# Patient Record
Sex: MALE | Race: WHITE | Employment: FULL TIME | URBAN - NONMETROPOLITAN AREA
[De-identification: names, ages, dates, MRNs, and addresses within clinical notes are randomized per-mention and may not be internally consistent; named-entity substitution may affect disease eponyms.]

---

## 2017-05-22 ENCOUNTER — OFFICE VISIT (OUTPATIENT)
Dept: FAMILY MEDICINE CLINIC | Facility: CLINIC | Age: 47
End: 2017-05-22

## 2017-05-22 VITALS
HEART RATE: 108 BPM | DIASTOLIC BLOOD PRESSURE: 80 MMHG | OXYGEN SATURATION: 98 % | TEMPERATURE: 98 F | WEIGHT: 175 LBS | SYSTOLIC BLOOD PRESSURE: 120 MMHG | RESPIRATION RATE: 12 BRPM

## 2017-05-22 DIAGNOSIS — K52.9 GASTROENTERITIS: Primary | ICD-10-CM

## 2017-05-22 PROCEDURE — 99213 OFFICE O/P EST LOW 20 MIN: CPT | Performed by: FAMILY MEDICINE

## 2017-05-22 NOTE — PATIENT INSTRUCTIONS
PUSH FLUIDS, CLEAR LIQUIDS AND ADVANCE TO BLAND / BRAT DIET AS TOLERATED, GOOD HANDWASHING, S/S OF DEHYDRATION DISCUSSED, F/U IF ? Patient was given note to be off work today and tomorrow. He may return to work on Wednesday if feeling better.   Patient ma

## 2017-05-22 NOTE — PROGRESS NOTES
HPI:    Patient ID: Lashaun Doyle is a 42year old male. Patient presents with:  Vomiting: DIARRHEA, BODY ACHES, FEVER--STARTED LAST NIGHT--    HPI acute onset of diarrhea , nausea and vomiting, fever, chills, wife and son had same symptoms over past 1-2 we temperature 98.1 °F (36.7 °C), temperature source Temporal, weight 175 lb, SpO2 98 %.        ASSESSMENT/PLAN:   Gastroenteritis  (primary encounter diagnosis)  Patient Instructions   PUSH FLUIDS, CLEAR LIQUIDS AND ADVANCE TO BLAND / BRAT DIET AS TOLERATED,

## 2018-01-04 ENCOUNTER — MED REC SCAN ONLY (OUTPATIENT)
Dept: FAMILY MEDICINE CLINIC | Facility: CLINIC | Age: 48
End: 2018-01-04

## 2018-01-27 ENCOUNTER — OFFICE VISIT (OUTPATIENT)
Dept: FAMILY MEDICINE CLINIC | Facility: CLINIC | Age: 48
End: 2018-01-27

## 2018-01-27 VITALS
DIASTOLIC BLOOD PRESSURE: 68 MMHG | HEART RATE: 72 BPM | HEIGHT: 68.5 IN | BODY MASS INDEX: 26.52 KG/M2 | OXYGEN SATURATION: 97 % | SYSTOLIC BLOOD PRESSURE: 120 MMHG | TEMPERATURE: 98 F | WEIGHT: 177 LBS

## 2018-01-27 DIAGNOSIS — K21.9 GASTROESOPHAGEAL REFLUX DISEASE WITHOUT ESOPHAGITIS: ICD-10-CM

## 2018-01-27 DIAGNOSIS — I34.1 MVP (MITRAL VALVE PROLAPSE): ICD-10-CM

## 2018-01-27 DIAGNOSIS — R07.89 ATYPICAL CHEST PAIN: Primary | ICD-10-CM

## 2018-01-27 PROBLEM — I34.0 MILD MITRAL REGURGITATION: Status: ACTIVE | Noted: 2018-01-27

## 2018-01-27 PROCEDURE — 99213 OFFICE O/P EST LOW 20 MIN: CPT | Performed by: FAMILY MEDICINE

## 2018-01-27 RX ORDER — OMEPRAZOLE 20 MG/1
20 CAPSULE, DELAYED RELEASE ORAL
Qty: 30 CAPSULE | Refills: 2 | Status: SHIPPED | OUTPATIENT
Start: 2018-01-27 | End: 2019-07-03 | Stop reason: ALTCHOICE

## 2018-01-27 NOTE — PROGRESS NOTES
Kirt Asencio is a 41year old male. Patient presents with:  ER F/U: Chest pain  Patient is here with his wife a cardiac cath tech  HPI:   Patient seen at [facility] emergency room 1/2/18 for chest pain. An echocardiogram was done.   Res related, edema  GI: denies abdominal pain , patient admits to heartburn several times per day, review of contributing factors reveals patient does tend to eat late.   He is out of town Monday through Thursday lives in a hotel  NEURO: denies headaches  PSYCH endoscopy. The patient indicates understanding of these issues and agrees to the plan. Vivian Young.  Jerri Morris, FAAFP

## 2019-07-03 ENCOUNTER — OFFICE VISIT (OUTPATIENT)
Dept: FAMILY MEDICINE CLINIC | Facility: CLINIC | Age: 49
End: 2019-07-03
Payer: COMMERCIAL

## 2019-07-03 VITALS
OXYGEN SATURATION: 98 % | WEIGHT: 179 LBS | TEMPERATURE: 99 F | DIASTOLIC BLOOD PRESSURE: 80 MMHG | BODY MASS INDEX: 27 KG/M2 | SYSTOLIC BLOOD PRESSURE: 110 MMHG | HEART RATE: 60 BPM

## 2019-07-03 DIAGNOSIS — G89.29 CHRONIC MIDLINE LOW BACK PAIN WITHOUT SCIATICA: Primary | ICD-10-CM

## 2019-07-03 DIAGNOSIS — M54.50 CHRONIC MIDLINE LOW BACK PAIN WITHOUT SCIATICA: Primary | ICD-10-CM

## 2019-07-03 DIAGNOSIS — M99.03 LUMBAR REGION SOMATIC DYSFUNCTION: ICD-10-CM

## 2019-07-03 DIAGNOSIS — M99.08 RIB CAGE REGION SOMATIC DYSFUNCTION: ICD-10-CM

## 2019-07-03 DIAGNOSIS — M99.01 CERVICAL (NECK) REGION SOMATIC DYSFUNCTION: ICD-10-CM

## 2019-07-03 PROCEDURE — 99213 OFFICE O/P EST LOW 20 MIN: CPT | Performed by: FAMILY MEDICINE

## 2019-07-03 PROCEDURE — 98926 OSTEOPATH MANJ 3-4 REGIONS: CPT | Performed by: FAMILY MEDICINE
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
## 2019-07-03 NOTE — PATIENT INSTRUCTIONS
Recommend moist heat for 10 to 15 minutes up to every 4 hours as needed followed by stretches.   Patient was instructed in proper spinal, hip, hamstring stretches  Zone 1 self activation techniques reviewed and demonstrated  Would recommend check imaging if

## 2020-02-03 NOTE — PROGRESS NOTES
Martha Mccracken is a 54year old male. Patient presents with:   Anxiety: needs referral for therapist.    HPI:    for government, travels a lot  Patient states that he has been having significant anxiety and stress with his job but also has Resp 12   Ht 68.5\"   Wt 179 lb (81.2 kg)   BMI 26.82 kg/m²   GENERAL: well developed, well nourished,in no apparent distress, well hydrated  SKIN: no rashes,no suspicious lesions  ENT: TMs: intact, good mobility, Nose: turbinates clear, no dc, Throat: no

## 2020-02-03 NOTE — PATIENT INSTRUCTIONS
20 minutes was spent in education and counseling as well as anticipatory guidance, emotional support provided  Patient referred to Dr. Lorri Angeles at [facility] for psychology intervention  We will start citalopram 10 mg nightly, side effects

## 2020-03-21 ENCOUNTER — TELEPHONE (OUTPATIENT)
Dept: FAMILY MEDICINE CLINIC | Facility: CLINIC | Age: 50
End: 2020-03-21

## 2020-03-21 NOTE — TELEPHONE ENCOUNTER
Patient's wife called to request refill for Citalopram Hydrobromide. She will call us back to let us know what pharmacy we should send his prescription to. He is currently in [place] for work.

## 2020-03-23 NOTE — TELEPHONE ENCOUNTER
Called patient's wife cell to get pharmacy information. No answer, could not leave voice mail, mailbox was full.

## 2020-06-20 ENCOUNTER — OFFICE VISIT (OUTPATIENT)
Dept: FAMILY MEDICINE CLINIC | Facility: CLINIC | Age: 50
End: 2020-06-20
Payer: COMMERCIAL

## 2020-06-20 VITALS
HEART RATE: 64 BPM | OXYGEN SATURATION: 96 % | WEIGHT: 178.38 LBS | BODY MASS INDEX: 27 KG/M2 | DIASTOLIC BLOOD PRESSURE: 80 MMHG | TEMPERATURE: 98 F | SYSTOLIC BLOOD PRESSURE: 124 MMHG

## 2020-06-20 DIAGNOSIS — D04.71 CARCINOMA IN SITU OF SKIN OF RIGHT LOWER EXTREMITY INCLUDING HIP: Primary | ICD-10-CM

## 2020-06-20 DIAGNOSIS — L98.9 SKIN LESION OF RIGHT LEG: ICD-10-CM

## 2020-06-20 DIAGNOSIS — L25.9 CONTACT DERMATITIS AND ECZEMA: ICD-10-CM

## 2020-06-20 PROCEDURE — 88305 TISSUE EXAM BY PATHOLOGIST: CPT | Performed by: FAMILY MEDICINE

## 2020-06-20 PROCEDURE — 99213 OFFICE O/P EST LOW 20 MIN: CPT | Performed by: FAMILY MEDICINE

## 2020-06-20 PROCEDURE — 11601 EXC TR-EXT MAL+MARG 0.6-1 CM: CPT | Performed by: FAMILY MEDICINE

## 2020-06-20 NOTE — PROGRESS NOTES
HPI:    Patient ID: Allan Livingston is a 54year old male.   Pt lesion R lower leg - increase size / bleeding  Concern w/  W/o hx skin cancer  Rash post R ankle - itchy  HPI    Review of Systems         Current Outpatient Medications   Medication Sig Dispen

## 2020-06-25 ENCOUNTER — TELEPHONE (OUTPATIENT)
Dept: FAMILY MEDICINE CLINIC | Facility: CLINIC | Age: 50
End: 2020-06-25

## 2020-06-25 DIAGNOSIS — C44.92 CANCER OF SKIN, SQUAMOUS CELL: Primary | ICD-10-CM

## 2020-06-25 NOTE — TELEPHONE ENCOUNTER
----- Message from Yeni Farrell DO sent at 6/23/2020 12:46 PM CDT -----  Squamous cell skin cancer involving margins. Recommend Dr. Lee brandon  For complete excision.

## 2020-06-25 NOTE — TELEPHONE ENCOUNTER
Final Diagnosis:   Skin, right posterior lower leg, biopsy:  -Squamous cell carcinoma in situ.  -The tumor involves the peripheral margins.      Electronically signed by Wilfredo Vaughn MD on 6/23/2020 at 12:33 PM     LEFT MESSAGE TO CALL OFFICE

## 2020-06-25 NOTE — TELEPHONE ENCOUNTER
Patient and spouse instructed; Dr Mirza Castro got on phone and answered patient's questions.   Will schedule appt with Dr Lucy Leo when gets back from [address] [place] [address]

## 2020-06-25 NOTE — TELEPHONE ENCOUNTER
Patient will call back with wife also on phone, she is a nurse. Dr Irvin Weiss said ok to wait 1 month, since presently out in [address] [place] [address]. Doctor got 95% of it, it is insitu, so encapsulated; so can see Dr Donovan Gonzalez when back.

## 2020-07-07 ENCOUNTER — OFFICE VISIT (OUTPATIENT)
Dept: SURGERY | Facility: CLINIC | Age: 50
End: 2020-07-07
Payer: COMMERCIAL

## 2020-07-07 ENCOUNTER — OFFICE VISIT (OUTPATIENT)
Dept: FAMILY MEDICINE CLINIC | Facility: CLINIC | Age: 50
End: 2020-07-07
Payer: COMMERCIAL

## 2020-07-07 VITALS
TEMPERATURE: 98 F | OXYGEN SATURATION: 98 % | DIASTOLIC BLOOD PRESSURE: 76 MMHG | HEART RATE: 67 BPM | BODY MASS INDEX: 26 KG/M2 | WEIGHT: 175 LBS | SYSTOLIC BLOOD PRESSURE: 120 MMHG

## 2020-07-07 VITALS — BODY MASS INDEX: 26.67 KG/M2 | WEIGHT: 178 LBS | HEIGHT: 68.5 IN | TEMPERATURE: 98 F

## 2020-07-07 DIAGNOSIS — D09.9 SQUAMOUS CELL CARCINOMA IN SITU: Primary | ICD-10-CM

## 2020-07-07 DIAGNOSIS — L25.9 CONTACT DERMATITIS AND ECZEMA: Primary | ICD-10-CM

## 2020-07-07 PROCEDURE — 99213 OFFICE O/P EST LOW 20 MIN: CPT | Performed by: FAMILY MEDICINE

## 2020-07-07 PROCEDURE — 99203 OFFICE O/P NEW LOW 30 MIN: CPT | Performed by: SURGERY

## 2020-07-07 PROCEDURE — 3008F BODY MASS INDEX DOCD: CPT | Performed by: SURGERY

## 2020-07-07 PROCEDURE — 3078F DIAST BP <80 MM HG: CPT | Performed by: FAMILY MEDICINE

## 2020-07-07 PROCEDURE — 3074F SYST BP LT 130 MM HG: CPT | Performed by: FAMILY MEDICINE

## 2020-07-07 RX ORDER — CLOBETASOL PROPIONATE 0.5 MG/G
1 CREAM TOPICAL 2 TIMES DAILY
Qty: 15 G | Refills: 0 | Status: SHIPPED | OUTPATIENT
Start: 2020-07-07

## 2020-07-07 NOTE — PROGRESS NOTES
HPI:    Patient ID: Charissa Mota is a 54year old male. Patient presents with:  Rash: x 1 week. both feet.  some leg swelling     Pt living in [place] campground in [place], [facility] in the area x 20 days  C/o itchy rash on tops of both feet x 1 week  Tr Discussed likely causes of edema being related to the heat, effects of prolonged standing and tightly tight boots. No medication indicated. Recommend low-sodium food choices and elevate legs at rest.  Avoid overly tightening boot lacsaige Gonzalez.  Michelle Guthrie

## 2020-07-07 NOTE — PATIENT INSTRUCTIONS
Recommend cool compresses and ice massage to manage itching, cut toenails short to avoid excoriations  Clobetasol 0.05% cream to be applied to affected area thinly twice daily as needed till clear  Discussed likely causes of edema being related to the heat

## 2020-07-10 ENCOUNTER — TELEPHONE (OUTPATIENT)
Dept: SURGERY | Facility: CLINIC | Age: 50
End: 2020-07-10

## 2020-07-10 DIAGNOSIS — D04.71 SQUAMOUS CELL CARCINOMA IN SITU (SCCIS) OF SKIN OF RIGHT CALF: Primary | ICD-10-CM

## 2020-07-17 ENCOUNTER — TELEPHONE (OUTPATIENT)
Dept: SURGERY | Facility: CLINIC | Age: 50
End: 2020-07-17

## 2020-07-21 ENCOUNTER — LAB ENCOUNTER (OUTPATIENT)
Dept: LAB | Facility: HOSPITAL | Age: 50
End: 2020-07-21
Attending: SURGERY
Payer: COMMERCIAL

## 2020-07-21 DIAGNOSIS — D04.9 SQUAMOUS CELL CARCINOMA IN SITU (SCCIS) OF SKIN: ICD-10-CM

## 2020-07-22 LAB — SARS-COV-2 RNA RESP QL NAA+PROBE: NOT DETECTED

## 2020-07-23 ENCOUNTER — ANESTHESIA EVENT (OUTPATIENT)
Dept: SURGERY | Facility: HOSPITAL | Age: 50
End: 2020-07-23
Payer: COMMERCIAL

## 2020-07-24 ENCOUNTER — ANESTHESIA (OUTPATIENT)
Dept: SURGERY | Facility: HOSPITAL | Age: 50
End: 2020-07-24
Payer: COMMERCIAL

## 2020-07-24 ENCOUNTER — HOSPITAL ENCOUNTER (OUTPATIENT)
Facility: HOSPITAL | Age: 50
Setting detail: HOSPITAL OUTPATIENT SURGERY
Discharge: HOME | End: 2020-07-24
Attending: SURGERY | Admitting: SURGERY
Payer: COMMERCIAL

## 2020-07-24 VITALS
DIASTOLIC BLOOD PRESSURE: 87 MMHG | OXYGEN SATURATION: 99 % | SYSTOLIC BLOOD PRESSURE: 121 MMHG | WEIGHT: 171.88 LBS | HEIGHT: 68 IN | BODY MASS INDEX: 26.05 KG/M2 | RESPIRATION RATE: 20 BRPM | HEART RATE: 42 BPM | TEMPERATURE: 98 F

## 2020-07-24 DIAGNOSIS — D04.71 SQUAMOUS CELL CARCINOMA IN SITU (SCCIS) OF SKIN OF RIGHT CALF: ICD-10-CM

## 2020-07-24 DIAGNOSIS — D04.9 SQUAMOUS CELL CARCINOMA IN SITU (SCCIS) OF SKIN: Primary | ICD-10-CM

## 2020-07-24 PROCEDURE — 0YB93ZX EXCISION OF RIGHT LOWER EXTREMITY, PERCUTANEOUS APPROACH, DIAGNOSTIC: ICD-10-PCS | Performed by: SURGERY

## 2020-07-24 PROCEDURE — 88305 TISSUE EXAM BY PATHOLOGIST: CPT | Performed by: SURGERY

## 2020-07-24 RX ORDER — SODIUM CHLORIDE, SODIUM LACTATE, POTASSIUM CHLORIDE, CALCIUM CHLORIDE 600; 310; 30; 20 MG/100ML; MG/100ML; MG/100ML; MG/100ML
INJECTION, SOLUTION INTRAVENOUS CONTINUOUS
Status: DISCONTINUED | OUTPATIENT
Start: 2020-07-24 | End: 2020-07-24

## 2020-07-24 RX ORDER — HYDROMORPHONE HYDROCHLORIDE 1 MG/ML
0.4 INJECTION, SOLUTION INTRAMUSCULAR; INTRAVENOUS; SUBCUTANEOUS EVERY 5 MIN PRN
Status: CANCELLED | OUTPATIENT
Start: 2020-07-24 | End: 2020-07-24

## 2020-07-24 RX ORDER — IBUPROFEN 200 MG
600 TABLET ORAL ONCE AS NEEDED
Status: CANCELLED | OUTPATIENT
Start: 2020-07-24 | End: 2020-07-24

## 2020-07-24 RX ORDER — ACETAMINOPHEN 500 MG
1000 TABLET ORAL ONCE AS NEEDED
Status: CANCELLED | OUTPATIENT
Start: 2020-07-24 | End: 2020-07-24

## 2020-07-24 RX ORDER — MIDAZOLAM HYDROCHLORIDE 1 MG/ML
INJECTION INTRAMUSCULAR; INTRAVENOUS AS NEEDED
Status: DISCONTINUED | OUTPATIENT
Start: 2020-07-24 | End: 2020-07-24 | Stop reason: SURG

## 2020-07-24 RX ORDER — MIDAZOLAM HYDROCHLORIDE 1 MG/ML
1 INJECTION INTRAMUSCULAR; INTRAVENOUS EVERY 5 MIN PRN
Status: CANCELLED | OUTPATIENT
Start: 2020-07-24 | End: 2020-07-24

## 2020-07-24 RX ORDER — BUPIVACAINE HYDROCHLORIDE 5 MG/ML
INJECTION, SOLUTION EPIDURAL; INTRACAUDAL AS NEEDED
Status: DISCONTINUED | OUTPATIENT
Start: 2020-07-24 | End: 2020-07-24 | Stop reason: HOSPADM

## 2020-07-24 RX ORDER — NALOXONE HYDROCHLORIDE 0.4 MG/ML
80 INJECTION, SOLUTION INTRAMUSCULAR; INTRAVENOUS; SUBCUTANEOUS AS NEEDED
Status: CANCELLED | OUTPATIENT
Start: 2020-07-24 | End: 2020-07-24

## 2020-07-24 RX ORDER — SODIUM CHLORIDE, SODIUM LACTATE, POTASSIUM CHLORIDE, CALCIUM CHLORIDE 600; 310; 30; 20 MG/100ML; MG/100ML; MG/100ML; MG/100ML
INJECTION, SOLUTION INTRAVENOUS CONTINUOUS
Status: CANCELLED | OUTPATIENT
Start: 2020-07-24

## 2020-07-24 RX ORDER — CEFAZOLIN SODIUM/WATER 2 G/20 ML
2 SYRINGE (ML) INTRAVENOUS ONCE
Status: COMPLETED | OUTPATIENT
Start: 2020-07-24 | End: 2020-07-24

## 2020-07-24 RX ORDER — ACETAMINOPHEN 500 MG
1000 TABLET ORAL ONCE
Status: DISCONTINUED | OUTPATIENT
Start: 2020-07-24 | End: 2020-07-24 | Stop reason: HOSPADM

## 2020-07-24 RX ORDER — HEPARIN SODIUM 5000 [USP'U]/ML
5000 INJECTION, SOLUTION INTRAVENOUS; SUBCUTANEOUS ONCE
Status: COMPLETED | OUTPATIENT
Start: 2020-07-24 | End: 2020-07-24

## 2020-07-24 RX ORDER — HYDROCODONE BITARTRATE AND ACETAMINOPHEN 5; 325 MG/1; MG/1
1 TABLET ORAL AS NEEDED
Status: CANCELLED | OUTPATIENT
Start: 2020-07-24

## 2020-07-24 RX ORDER — HYDROCODONE BITARTRATE AND ACETAMINOPHEN 5; 325 MG/1; MG/1
2 TABLET ORAL AS NEEDED
Status: CANCELLED | OUTPATIENT
Start: 2020-07-24

## 2020-07-24 RX ORDER — ONDANSETRON 2 MG/ML
4 INJECTION INTRAMUSCULAR; INTRAVENOUS AS NEEDED
Status: CANCELLED | OUTPATIENT
Start: 2020-07-24 | End: 2020-07-24

## 2020-07-24 RX ORDER — ACETAMINOPHEN 500 MG
500 TABLET ORAL EVERY 6 HOURS PRN
COMMUNITY

## 2020-07-24 RX ORDER — LABETALOL HYDROCHLORIDE 5 MG/ML
5 INJECTION, SOLUTION INTRAVENOUS EVERY 5 MIN PRN
Status: CANCELLED | OUTPATIENT
Start: 2020-07-24 | End: 2020-07-24

## 2020-07-24 RX ADMIN — CEFAZOLIN SODIUM/WATER 2 G: 2 G/20 ML SYRINGE (ML) INTRAVENOUS at 11:08:00

## 2020-07-24 RX ADMIN — MIDAZOLAM HYDROCHLORIDE 2 MG: 1 INJECTION INTRAMUSCULAR; INTRAVENOUS at 11:05:00

## 2020-07-24 RX ADMIN — SODIUM CHLORIDE, SODIUM LACTATE, POTASSIUM CHLORIDE, CALCIUM CHLORIDE: 600; 310; 30; 20 INJECTION, SOLUTION INTRAVENOUS at 12:07:00

## 2020-07-24 NOTE — H&P
Patient presents with skin lesion right posterior calf. Patient states that the lesion has been present for at least the last few months. He states it is increasing in size occasionally ulcerates and bleeds.   He denies a history of skin cancer elsewhere no tea colored urine  MUSCULOSKELETAL: no joint complaints upper or lower extremities  HEMATOLOGY: denies hx anemia; denies bruising or excessive bleeding  Endocrine: no weight gain or loss no hot or cold intolerance     EXAM      Temperature 98.1 °F (36.7 here.   • Clinical Information 06/20/2020     Final                    Value: This result contains rich text formatting which cannot be displayed here. • Gross Description 06/20/2020     Final                    Value: This result contains rich text format

## 2020-07-24 NOTE — H&P
Saul Holloway is a 54year old male  Patient presents with:  Lesion: new patient referred by Dr. Alexus Jarvis for lesion right leg. bx lesion on right leg done 2020. REFERRED BY    Patient presents with skin lesion right posterior calf.   Patient states GI: denies nausea, vomiting, constipation, diarrhea; no rectal bleeding; no heartburn  GENITAL/: no dysuria, urgency or frequency, no tea colored urine  MUSCULOSKELETAL: no joint complaints upper or lower extremities  HEMATOLOGY: denies hx anemia; enrique 06/20/2020    Final                    Value: This result contains rich text formatting which cannot be displayed here. • Clinical Information 06/20/2020    Final                    Value: This result contains rich text formatting which cannot be displayed

## 2020-07-24 NOTE — ANESTHESIA POSTPROCEDURE EVALUATION
[address]  Patient Status:  Hospital Outpatient Surgery   Age/Gender 54year old male MRN [id number]   [facility] SURGERY Attending Lisy Aparicio, [facility] Day # 0 PCP Hosea Salcedo.  Nita Martin DO       Anesthesia Post-op N

## 2020-07-25 NOTE — OPERATIVE REPORT
[address]    PATIENT'S NAME: William Butt   ATTENDING PHYSICIAN: Elizabeth Shipman D.O.   OPERATING PHYSICIAN: Elizabeth Shipman D.O.   PATIENT ACCOUNT#:   [de-identified]    LOCATION:  [address] [place] [address] EDW 10  MEDICAL RECORD #:   [id number]       SUKHI Attention was then directed toward the excision site where the harvested full-thickness skin graft was placed over the wound.   It was sutured at the perimeter using 4-0 nylon sutures, 4 of these were left long at the 12 o'clock, 3 o'clock, 6 o'clock and 9

## 2020-07-28 NOTE — PROGRESS NOTES
Called pt and relayed results. V/U and no further questions.  Scheduled for stitch removal.     Future Appointments  7/31/2020  1:00 PM    Shakeel Samuel, [address]      [facility]

## 2020-07-29 ENCOUNTER — MED REC SCAN ONLY (OUTPATIENT)
Dept: SURGERY | Facility: CLINIC | Age: 50
End: 2020-07-29

## 2020-07-31 ENCOUNTER — TELEPHONE (OUTPATIENT)
Dept: SURGERY | Facility: CLINIC | Age: 50
End: 2020-07-31

## 2020-07-31 ENCOUNTER — OFFICE VISIT (OUTPATIENT)
Dept: SURGERY | Facility: CLINIC | Age: 50
End: 2020-07-31

## 2020-07-31 VITALS
BODY MASS INDEX: 27 KG/M2 | WEIGHT: 177 LBS | DIASTOLIC BLOOD PRESSURE: 69 MMHG | SYSTOLIC BLOOD PRESSURE: 108 MMHG | HEART RATE: 64 BPM | TEMPERATURE: 98 F

## 2020-07-31 DIAGNOSIS — D09.9 SQUAMOUS CELL CARCINOMA IN SITU: Primary | ICD-10-CM

## 2020-07-31 PROCEDURE — 99024 POSTOP FOLLOW-UP VISIT: CPT | Performed by: PHYSICIAN ASSISTANT

## 2020-07-31 PROCEDURE — 3074F SYST BP LT 130 MM HG: CPT | Performed by: PHYSICIAN ASSISTANT

## 2020-07-31 PROCEDURE — 3078F DIAST BP <80 MM HG: CPT | Performed by: PHYSICIAN ASSISTANT

## 2020-07-31 NOTE — TELEPHONE ENCOUNTER
Attempted to reach patient and spouse. Unable to leave message-voicemail full, wanted to give instructions about dressing change and to  adaptic at the [place] office along with a sheet of instructions. Contacted pt on 3rd attempt.

## 2020-07-31 NOTE — PROGRESS NOTES
Post Operative Visit Note       Active Problems  1. Squamous cell carcinoma in situ         Chief Complaint   Patient presents with:  Post-Op: 7/24 RWM EXCISION SQUAMOUS CELL CARCINOMA RIGHT CALF WITH FULL THICKNESS SKIN GRAFT, DONOR RIGHT THIGH .  Patient Smoking status: Never Smoker      Smokeless tobacco: Never Used    Substance and Sexual Activity      Alcohol use: Yes        Frequency: Monthly or less        Drinks per session: 1 or 2        Binge frequency: Never      Drug use: No    Other Topics Physical Findings   /69   Pulse 64   Temp 97.9 °F (36.6 °C)   Wt 177 lb (80.3 kg)   BMI 26.91 kg/m²   Physical Exam   Constitutional: He is oriented to person, place, and time. He appears well-developed and well-nourished. No distress.    HENT: The patient is to place an Adaptic over the wound. He should leave this in place for 3 days. He may cover the Adaptic with a gauze. After 3 days he should change the Adaptic gauze out and leave the dressing in place for an additional 3 days.   He should

## 2020-08-04 ENCOUNTER — MED REC SCAN ONLY (OUTPATIENT)
Dept: SURGERY | Facility: CLINIC | Age: 50
End: 2020-08-04

## 2020-08-06 ENCOUNTER — TELEPHONE (OUTPATIENT)
Dept: SURGERY | Facility: CLINIC | Age: 50
End: 2020-08-06

## 2020-08-06 NOTE — TELEPHONE ENCOUNTER
Patient calling states is to have the remaining of his sutures removed tomorrow. He is now in [address] and has a health care member that can remove them there. He is just clarifying on wound care instructions.      Spoke with Lindsay PETTY on wound care

## 2021-12-28 ENCOUNTER — TELEPHONE (OUTPATIENT)
Dept: FAMILY MEDICINE CLINIC | Facility: CLINIC | Age: 51
End: 2021-12-28

## 2021-12-28 NOTE — TELEPHONE ENCOUNTER
Spoke with pt. States he started with a cough a few days ago. Went to work yesterday anyway, found out someone else had Covid. Today, started with body aches, chills and fever also. Stayed home, did a home rapid test---was +.   Asks if he needs to have a PC

## 2021-12-28 NOTE — TELEPHONE ENCOUNTER
Pt did at home covid test today & it was positive. He has cough, body aches & fever. Please call to discuss.

## 2023-02-27 ENCOUNTER — PATIENT OUTREACH (OUTPATIENT)
Dept: FAMILY MEDICINE CLINIC | Facility: CLINIC | Age: 53
End: 2023-02-27

## 2024-10-28 ENCOUNTER — TELEPHONE (OUTPATIENT)
Dept: FAMILY MEDICINE CLINIC | Facility: CLINIC | Age: 54
End: 2024-10-28
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
## 2024-10-29 ENCOUNTER — APPOINTMENT (OUTPATIENT)
Dept: GENERAL RADIOLOGY | Age: 54
End: 2024-10-29
Attending: NURSE PRACTITIONER
Payer: COMMERCIAL

## 2024-10-29 ENCOUNTER — HOSPITAL ENCOUNTER (OUTPATIENT)
Age: 54
Discharge: HOME | End: 2024-10-29
Payer: COMMERCIAL

## 2024-10-29 VITALS
TEMPERATURE: 97 F | HEART RATE: 66 BPM | WEIGHT: 175 LBS | OXYGEN SATURATION: 100 % | DIASTOLIC BLOOD PRESSURE: 82 MMHG | BODY MASS INDEX: 26.52 KG/M2 | HEIGHT: 68 IN | SYSTOLIC BLOOD PRESSURE: 114 MMHG | RESPIRATION RATE: 16 BRPM

## 2024-10-29 DIAGNOSIS — S92.425A CLOSED NONDISPLACED FRACTURE OF DISTAL PHALANX OF LEFT GREAT TOE, INITIAL ENCOUNTER: Primary | ICD-10-CM

## 2024-10-29 PROCEDURE — 99204 OFFICE O/P NEW MOD 45 MIN: CPT | Performed by: NURSE PRACTITIONER

## 2024-10-29 PROCEDURE — L3260 AMBULATORY SURGICAL BOOT EAC: HCPCS | Performed by: NURSE PRACTITIONER

## 2024-10-29 PROCEDURE — 73660 X-RAY EXAM OF TOE(S): CPT | Performed by: NURSE PRACTITIONER

## 2024-10-29 RX ORDER — IBUPROFEN 600 MG/1
1 TABLET, FILM COATED ORAL 2 TIMES DAILY PRN
COMMUNITY
Start: 2024-08-27

## 2024-10-29 RX ORDER — TRAZODONE HYDROCHLORIDE 100 MG/1
1.5 TABLET ORAL NIGHTLY PRN
COMMUNITY
Start: 2024-09-06

## 2024-10-29 RX ORDER — BUPROPION HYDROCHLORIDE 300 MG/1
1 TABLET ORAL DAILY
COMMUNITY
Start: 2024-09-06

## 2024-10-29 RX ORDER — CEPHALEXIN 500 MG/1
500 CAPSULE ORAL 2 TIMES DAILY
Qty: 14 CAPSULE | Refills: 0 | Status: SHIPPED | OUTPATIENT
Start: 2024-10-29 | End: 2024-11-05
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
## 2025-02-28 ENCOUNTER — OFFICE VISIT (OUTPATIENT)
Dept: FAMILY MEDICINE CLINIC | Facility: CLINIC | Age: 55
End: 2025-02-28
Payer: COMMERCIAL

## 2025-02-28 VITALS
OXYGEN SATURATION: 98 % | RESPIRATION RATE: 16 BRPM | TEMPERATURE: 98 F | HEART RATE: 60 BPM | DIASTOLIC BLOOD PRESSURE: 68 MMHG | HEIGHT: 67.91 IN | BODY MASS INDEX: 26.22 KG/M2 | WEIGHT: 171 LBS | SYSTOLIC BLOOD PRESSURE: 118 MMHG

## 2025-02-28 DIAGNOSIS — F51.04 PSYCHOPHYSIOLOGICAL INSOMNIA: ICD-10-CM

## 2025-02-28 DIAGNOSIS — Z00.00 PREVENTATIVE HEALTH CARE: Primary | ICD-10-CM

## 2025-02-28 DIAGNOSIS — Z12.5 SCREENING FOR PROSTATE CANCER: ICD-10-CM

## 2025-02-28 DIAGNOSIS — L08.9 SKIN PUSTULE: ICD-10-CM

## 2025-02-28 DIAGNOSIS — G57.01 PIRIFORMIS SYNDROME OF RIGHT SIDE: ICD-10-CM

## 2025-02-28 DIAGNOSIS — G47.33 OSA ON CPAP: ICD-10-CM

## 2025-02-28 DIAGNOSIS — F41.8 DEPRESSION WITH ANXIETY: ICD-10-CM

## 2025-02-28 DIAGNOSIS — Z12.11 SCREEN FOR COLON CANCER: ICD-10-CM

## 2025-02-28 DIAGNOSIS — F43.10 PTSD (POST-TRAUMATIC STRESS DISORDER): ICD-10-CM

## 2025-02-28 DIAGNOSIS — I34.0 MILD MITRAL REGURGITATION: ICD-10-CM

## 2025-02-28 DIAGNOSIS — I34.1 MVP (MITRAL VALVE PROLAPSE): ICD-10-CM

## 2025-02-28 PROCEDURE — 99386 PREV VISIT NEW AGE 40-64: CPT | Performed by: FAMILY MEDICINE

## 2025-02-28 RX ORDER — LIDOCAINE 50 MG/G
PATCH TOPICAL
COMMUNITY
Start: 2025-02-21

## 2025-02-28 NOTE — H&P
Jude Wilkes is a 54 year old male who presents for a complete physical exam.   HPI:   Pt voices concerns: Reestablish care, [facility].  Complains of right posterior hip pain.  [facility] reports [facility] not [facility].  He reports having labs last month.    Wt Readings from Last 6 Encounters:   02/28/25 171 lb (77.6 kg)   10/29/24 175 lb (79.4 kg)   07/31/20 177 lb (80.3 kg)   07/24/20 171 lb 13.6 oz (78 kg)   07/07/20 175 lb (79.4 kg)   07/07/20 178 lb (80.7 kg)     Body mass index is 26.07 kg/m².     Cholesterol, Total (mg/dL)   Date Value   12/02/2016 145     HDL Cholesterol (mg/dL)   Date Value   12/02/2016 47     LDL Cholesterol (mg/dL)   Date Value   12/02/2016 72     AST (U/L)   Date Value   12/02/2016 29     ALT (U/L)   Date Value   12/02/2016 45      Current Outpatient Medications   Medication Sig Dispense Refill    lidocaine 5 % External Patch Place onto the skin.      buPROPion  MG Oral Tablet 24 Hr Take 1 tablet (300 mg total) by mouth daily.      cholecalciferol 125 MCG (5000 UT) Oral Cap Take by mouth.      ibuprofen 600 MG Oral Tab Take 1 tablet (600 mg total) by mouth 2 (two) times daily as needed.      traZODone 100 MG Oral Tab Take 1.5 tablets (150 mg total) by mouth nightly as needed.      acetaminophen 500 MG Oral Tab Take 1 tablet (500 mg total) by mouth every 6 (six) hours as needed for Pain.      Clobetasol Propionate 0.05 % External Cream Apply 1 Application topically 2 (two) times daily. 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 lung    Heart Disorder Mother     Hypertension Mother     No Known Problems Daughter     No Known Problems Son     Heart Disorder Maternal Grandmother     No Known Problems Sister     No Known Problems Sister     Cancer Paternal Uncle     No Known Problems Half-Brother       Social History:  Social History     Socioeconomic History    Marital status:    Tobacco Use    Smoking status: Never     Passive exposure: Never    Smokeless tobacco: Never   Vaping Use    Vaping status: Never Used   Substance and Sexual Activity    Alcohol use: Not Currently    Drug use: No   Other Topics Concern    Caffeine Concern Yes    Exercise No    Seat Belt Yes    Special Diet No    Stress Concern Yes    Weight Concern Yes   Social History Narrative    Patient moved from [facility] 6 months ago, currently in reserves for [employer]   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 : +. Children: 2.   Exercise:  pickle ball occ .  Diet: watches sugar closely     REVIEW OF SYSTEMS:   GENERAL: generally feels well, no fatigue, denies excessive daytime drowsiness, good appetite  SKIN: denies any unusual skin lesions or rashes, [facility]  EYES:denies blurred vision or double vision, glasses/ contacts: +, last eye exam:[facility]/ [facility]  ENT: denies nasal congestion, PND or ST, + snoring and reported periods of apnea-[facility]AP [facility] by [facility], denies hearing deficits  LUNGS: denies  shortness of breath with exertion, no coughing or wheezing  CARDIOVASCULAR: denies chest pain on exertion, palpations, anginal equivalent symptoms, no claudication , no peripheral edema  GI: denies abdominal pain,denies heartburn, constipation, diarrhea, or change in bowel habits, patient [facility] the [facility] a year ago  : denies dysuria, hesitancy,nocturia, decreased urine stream, incontinence, erectile dysfunction, decreased libido   MUSCULOSKELETAL: denies back pain, complains of right hip pain, recent imaging of right hip and lumbar spine showed mild OA of the hip, degenerative disc disease and facet arthropathy of the lower spine  NEURO: denies headaches, tremors, dizziness, numbness and weakness  PSYCHE: denies depression or anxiety, + sleep difficulty- takes Trazodone, sees therapist Q 3-6 months, [facility]  HEMATOLOGIC: denies unexplained bruising or bleeding  ENDOCRINE: denies heat or cold intolerance , no unexplained wt loss or gain  EXAM:   /68 (BP Location: Left arm, Patient Position: Sitting, Cuff Size: adult)   Pulse 60   Temp 98.3 °F (36.8 °C) (Temporal)   Resp 16   Ht 5' 7.91\" (1.725 m)   Wt 171 lb (77.6 kg)   SpO2 98%   BMI 26.07 kg/m²   Body mass index is 26.07 kg/m².   GENERAL: well developed, well nourished,in no apparent distress  SKIN: no rashes,no suspicious lesions,tattoos right pectoral , left forearm, small pustule in the abdomen  ENT: EAC:  Clear, TMs: intact, Nose: turbinates normal, septum: midline, discharge: none, Pharynx: class 2 airway, no oral lesions  EYES:PERRLA, EOMI, normal optic disk,conjunctiva are clear  NECK: supple,no adenopathy,no bruits, no thyromegaly  LUNGS: clear to auscultation, no w/r/r  CARDIO: RRR without murmur, no S3, S4, strong peripheral pulses, no peripheral edema  GI: +NBS's,no masses, HSM or tenderness  : two descended testes,no masses,no hernia,no penile lesions  BACK:  : FROM, No lateral curves, Flexion:   Fingers to shins, no significant discomfort to palpation over the lower lumbosacral region  EXTREMITIES: no cyanosis, clubbing or edema, FROM of all joints tested  Very tight hamstrings right greater than left, right hip: Decreased external rotation and flexion, tight piriformis, no significant tenderness along the ITB  NEURO: A &O X 3,cranial nerves are intact,motor and sensory are grossly intact, DTRs +2/4 UE/LE bilaterally  PSYCH: affect: bright, speech: rapid and pressured, Insight: appropriate  ASSESSMENT AND PLAN:   Jude Wilkes is a 54 year old male   Encounter Diagnoses   Name Primary?    Preventative health care Yes    Screen for colon cancer     Screening for prostate cancer     MVP (mitral valve prolapse)- ECHO 1/2/18     Mild mitral regurgitation- ECHO 1/2/18     PTSD (post-traumatic stress disorder)     Depression with anxiety     Psychophysiological insomnia     ZULEIMA on CPAP     Piriformis syndrome of right side     Skin pustule      No orders of the defined types were placed in this encounter.    Meds & Refills for this Visit:  Requested Prescriptions      No prescriptions requested or ordered in this encounter     Imaging & Consults:  None  No follow-ups on file.  Patient Instructions   1. Preventative health care  I reviewed age-appropriate preventative health screening exams as well as immunizations.  Patient declines all vaccines at this time.  I have asked [facility] to [facility] of most recent labs and colonoscopy    2. Screen for colon cancer  Check recent colonoscopy report    3. Screening for prostate cancer  Check recent labs, recommend annual screening    4. MVP (mitral valve prolapse)- ECHO 1/2/18  Monitor clinically    5. Mild mitral regurgitation- ECHO 1/2/18  Monitor clinically    6. PTSD (post-traumatic stress disorder)  Continue current meds, counseling and psychiatric care    7. Depression with anxiety  See #6    8. Psychophysiological insomnia  See #6    9.  ZULEIMA on CPAP  Discussed importance of nightly use of CPAP, continue pulmonary follow-up    10. Piriformis syndrome of right side  Patient instructed in proper spinal, hip, hamstring, piriformis stretches.  Recent imaging reviewed    11. Skin pustule  Recommend warm compresses, discussed possible need for antibiotics pending resolution    Blaine Copeland DO, FAAFP           [1] No Known Allergies

## 2025-02-28 NOTE — PATIENT INSTRUCTIONS
1. Preventative health care  I reviewed age-appropriate preventative health screening exams as well as immunizations.  Patient declines all vaccines at this time.  I have asked [facility] of most recent labs and colonoscopy    2. Screen for colon cancer  Check recent colonoscopy report    3. Screening for prostate cancer  Check recent labs, recommend annual screening    4. MVP (mitral valve prolapse)- ECHO 1/2/18  Monitor clinically    5. Mild mitral regurgitation- ECHO 1/2/18  Monitor clinically    6. PTSD (post-traumatic stress disorder)  Continue current meds, counseling and psychiatric care    7. Depression with anxiety  See #6    8. Psychophysiological insomnia  See #6    9. ZULEIMA on CPAP  Discussed importance of nightly use of CPAP, continue pulmonary follow-up    10. Piriformis syndrome of right side  Patient instructed in proper spinal, hip, hamstring, piriformis stretches.  Recent imaging reviewed    11. Skin pustule  Recommend warm compresses, discussed possible need for antibiotics pending resolution

## 2025-07-02 ENCOUNTER — OFFICE VISIT (OUTPATIENT)
Dept: FAMILY MEDICINE CLINIC | Facility: CLINIC | Age: 55
End: 2025-07-02
Payer: COMMERCIAL

## 2025-07-02 VITALS
WEIGHT: 179.19 LBS | RESPIRATION RATE: 15 BRPM | BODY MASS INDEX: 27.47 KG/M2 | TEMPERATURE: 97 F | OXYGEN SATURATION: 97 % | DIASTOLIC BLOOD PRESSURE: 80 MMHG | HEIGHT: 67.91 IN | SYSTOLIC BLOOD PRESSURE: 120 MMHG | HEART RATE: 55 BPM

## 2025-07-02 DIAGNOSIS — J02.9 SORE THROAT: Primary | ICD-10-CM

## 2025-07-02 DIAGNOSIS — R05.1 ACUTE COUGH: ICD-10-CM

## 2025-07-02 DIAGNOSIS — R09.81 NASAL CONGESTION: ICD-10-CM

## 2025-07-02 LAB
CONTROL LINE PRESENT WITH A CLEAR BACKGROUND (YES/NO): YES YES/NO
KIT LOT #: NORMAL NUMERIC
STREP GRP A CUL-SCR: NEGATIVE

## 2025-07-02 PROCEDURE — 99213 OFFICE O/P EST LOW 20 MIN: CPT

## 2025-07-02 PROCEDURE — 87880 STREP A ASSAY W/OPTIC: CPT

## 2025-07-02 PROCEDURE — 87081 CULTURE SCREEN ONLY: CPT

## 2025-07-02 RX ORDER — CYCLOBENZAPRINE HCL 10 MG
TABLET ORAL
COMMUNITY
Start: 2025-03-06

## 2025-07-02 NOTE — PROGRESS NOTES
Subjective:   Jude Wilkes is a 54 year old male who presents for Sore Throat (Sore throat started 7/1 not taking anything ), Cough (Started last night not taking anything ), Sneezing (Sneezing, when asked if he is sneezing more then normal he said that he hardly sneezes, not taking any thing for this ), Nasal Congestion (Started 7/1, not taking anything for this ), and Runny Nose (Started 7/1 not taking anything )     History/Other:   History of Present Illness  Jude Wilkes is a 54 year old male who presents with nasal congestion, sore throat and cough.     He began experiencing symptoms of congestion, runny nose, and ear pain yesterday. He felt clammy and sweaty last night, indicating a possible fever. He has not tried any medications for these symptoms, including over-the-counter options like Claritin, Zyrtec, or Afrin nasal spray.    He recently returned from [place], where he works on a rotational schedule of twenty days on and ten days off. He returned home last Friday and has been dealing with dental issues, having undergone two root canals, one starting last Thursday and finishing this morning.    His household has been affected by illness, although he is the first to seek medical attention. No history of seasonal allergies.     Chief Complaint Reviewed and Verified  Nursing Notes Reviewed and   Verified           Review of Systems:  Pertinent items are noted in HPI.      Objective:   Temp 97.2 °F (36.2 °C) (Temporal)   Ht 5' 7.91\" (1.725 m)   Wt 179 lb 3.2 oz (81.3 kg)   BMI 27.32 kg/m²  Estimated body mass index is 27.32 kg/m² as calculated from the following:    Height as of this encounter: 5' 7.91\" (1.725 m).    Weight as of this encounter: 179 lb 3.2 oz (81.3 kg).  Results  LABS  Rapid strep test: Negative (07/02/2025)       Physical Exam  Constitutional:       Appearance: Normal appearance.   HENT:      Head: Normocephalic.      Right Ear: Tympanic membrane is retracted.      Left Ear:  Tympanic membrane is retracted.      Nose: Congestion and rhinorrhea present.      Mouth/Throat:      Mouth: Mucous membranes are moist.      Pharynx: Posterior oropharyngeal erythema present.   Eyes:      Pupils: Pupils are equal, round, and reactive to light.   Cardiovascular:      Rate and Rhythm: Normal rate and regular rhythm.      Pulses: Normal pulses.      Heart sounds: Normal heart sounds.   Pulmonary:      Effort: Pulmonary effort is normal.      Breath sounds: Normal breath sounds.   Lymphadenopathy:      Cervical: Cervical adenopathy present.   Skin:     General: Skin is warm and dry.      Capillary Refill: Capillary refill takes less than 2 seconds.   Neurological:      Mental Status: He is alert.       Assessment & Plan    Acute upper respiratory illness  Acute upper respiratory illness with congestion, rhinorrhea, ear pressure, and possible fever. Negative strep test. Likely exacerbated by smoke exposure. Differential includes viral infection and environmental irritants.  - Recommend loratadine or cetirizine for congestion relief.  - Advise oxymetazoline nasal spray for no more than three days.  - Suggest fluticasone nasal spray after oxymetazoline.  - Consider pseudoephedrine for additional congestion relief.  - Send throat culture to lab for further analysis and follow-up if necessary.        Evelin Murry, APRN, 7/2/2025

## 2025-07-02 NOTE — PATIENT INSTRUCTIONS
Recommend use of cool mist humidifier, intranasal Flonase, allergy medication daily (Claritin or zyrtec) and sudafed/tylenol sinus if needed.

## 2025-07-02 NOTE — PROGRESS NOTES
The following individual(s) verbally consented to be recorded using ambient AI listening technology and understand that they can each withdraw their consent to this listening technology at any point by asking the clinician to turn off or pause the recording:    Patient name: Jude Granadoseli

## (undated) DEVICE — SUTURE ETHILON 4-0 PS-2

## (undated) DEVICE — SOL  .9 1000ML BTL

## (undated) DEVICE — REM POLYHESIVE ADULT PATIENT RETURN ELECTRODE: Brand: VALLEYLAB

## (undated) DEVICE — MINI LAP PACK-LF: Brand: MEDLINE INDUSTRIES, INC.

## (undated) DEVICE — SUTURE MONOCRYL 4-0 PS-2

## (undated) DEVICE — CHLORAPREP 26ML APPLICATOR

## (undated) DEVICE — VIOLET BRAIDED (POLYGLACTIN 910), SYNTHETIC ABSORBABLE SUTURE: Brand: COATED VICRYL

## (undated) DEVICE — UNDYED BRAIDED (POLYGLACTIN 910), SYNTHETIC ABSORBABLE SUTURE: Brand: COATED VICRYL

## (undated) DEVICE — STERILE SYNTHETIC POLYISOPRENE POWDER-FREE SURGICAL GLOVES WITH HYDROGEL COATING: Brand: PROTEXIS

## (undated) DEVICE — NON-ADHERENT STRIPS,OIL EMULSION: Brand: CURITY

## (undated) DEVICE — KENDALL SCD EXPRESS SLEEVES, KNEE LENGTH, MEDIUM: Brand: KENDALL SCD

## (undated) NOTE — Clinical Note
Date: 5/22/2017    Patient Name: Naya Blake          To Whom it may concern: This letter has been written at the patient's request. The above patient was seen at the [facility] for treatment of a medical condition.     This patient shoul

## (undated) NOTE — LETTER
07/31/20      Dressing Change Instructions:    Please cute the Adaptic dressing to the size of the wound. Place an Adaptic dressing over the wound and then cover it with a dry gauze. You should leave the Adaptic in place for 3 days.  Then change the dressin

## (undated) NOTE — MR AVS SNAPSHOT
[facility]  [address] [address]  [phone]               Thank you for choosing us for your health care visit with Robert Tejeda. Matias Knight DO.   We are glad to serve you and happy to provide you with this sum For medical emergencies, dial 911.            Visit [facility] online at  [email or web address]

## (undated) NOTE — LETTER
2020    Medical Note    Name: Kareem Kinsey        : 1970    To Whom It May Concern,    Kareem Kinsey had surgery on 2020 and had a skin graft placed on his right calf. Half of the sutures were removed at today's office visit.  The rem